# Patient Record
Sex: FEMALE | Race: WHITE | Employment: FULL TIME | URBAN - METROPOLITAN AREA
[De-identification: names, ages, dates, MRNs, and addresses within clinical notes are randomized per-mention and may not be internally consistent; named-entity substitution may affect disease eponyms.]

---

## 2020-08-18 ENCOUNTER — OFFICE VISIT (OUTPATIENT)
Dept: PRIMARY CARE CLINIC | Age: 30
End: 2020-08-18
Payer: COMMERCIAL

## 2020-08-18 VITALS
HEIGHT: 68 IN | OXYGEN SATURATION: 98 % | HEART RATE: 83 BPM | SYSTOLIC BLOOD PRESSURE: 123 MMHG | RESPIRATION RATE: 20 BRPM | DIASTOLIC BLOOD PRESSURE: 86 MMHG | BODY MASS INDEX: 20.04 KG/M2 | TEMPERATURE: 97.2 F | WEIGHT: 132.25 LBS

## 2020-08-18 VITALS
HEART RATE: 62 BPM | BODY MASS INDEX: 22.34 KG/M2 | DIASTOLIC BLOOD PRESSURE: 59 MMHG | SYSTOLIC BLOOD PRESSURE: 120 MMHG | OXYGEN SATURATION: 97 % | TEMPERATURE: 97.6 F | RESPIRATION RATE: 16 BRPM | WEIGHT: 147.38 LBS | HEIGHT: 68 IN

## 2020-08-18 DIAGNOSIS — Z00.00 ADULT GENERAL MEDICAL EXAM: Primary | ICD-10-CM

## 2020-08-18 PROBLEM — E04.1 THYROID NODULE: Status: ACTIVE | Noted: 2018-06-04

## 2020-08-18 PROCEDURE — 99395 PREV VISIT EST AGE 18-39: CPT | Performed by: FAMILY MEDICINE

## 2020-08-18 RX ORDER — DEXTROAMPHETAMINE SACCHARATE, AMPHETAMINE ASPARTATE, DEXTROAMPHETAMINE SULFATE AND AMPHETAMINE SULFATE 2.5; 2.5; 2.5; 2.5 MG/1; MG/1; MG/1; MG/1
1 TABLET ORAL DAILY
COMMUNITY
Start: 2020-08-16 | End: 2020-08-18 | Stop reason: CLARIF

## 2020-08-18 NOTE — PROGRESS NOTES
Bobby Mejia is a 31 y.o. female who presents today for the following:  Chief Complaint   Patient presents with    Annual Wellness Visit        This patient comes in today for follow up of the following medical conditions: Thyroid Nodule They also have some new problems including: Allergies   Allergen Reactions    Sulfa (Sulfonamide Antibiotics) Hives       No current outpatient medications on file. No current facility-administered medications for this visit. Past Medical History:   Diagnosis Date    Thyroid nodule 6/4/2018       Past Surgical History:   Procedure Laterality Date    HX KNEE ARTHROSCOPY Right 2004    Benign tumor caused femur fracture    HX KNEE ARTHROSCOPY Right 2008    HX KNEE ARTHROSCOPY Right 2009       Family History   Problem Relation Age of Onset    Thyroid Disease Mother     Neuropathy Father        Social History     Socioeconomic History    Marital status:      Spouse name: Not on file    Number of children: Not on file    Years of education: Not on file    Highest education level: Not on file   Occupational History    Not on file   Social Needs    Financial resource strain: Not on file    Food insecurity     Worry: Not on file     Inability: Not on file    Transportation needs     Medical: Not on file     Non-medical: Not on file   Tobacco Use    Smoking status: Never Smoker    Smokeless tobacco: Never Used   Substance and Sexual Activity    Alcohol use: Yes     Alcohol/week: 1.0 standard drinks     Types: 1 Glasses of wine per week     Frequency: Monthly or less     Drinks per session: 1 or 2     Binge frequency: Never     Comment: Occasionally    Drug use: Never    Sexual activity: Yes     Partners: Male     Birth control/protection: I.U.D.    Lifestyle    Physical activity     Days per week: Not on file     Minutes per session: Not on file    Stress: Not on file   Relationships    Social connections     Talks on phone: Not on file Gets together: Not on file     Attends [employer] service: Not on file     Active member of club or organization: Not on file     Attends meetings of clubs or organizations: Not on file     Relationship status: Not on file    Intimate partner violence     Fear of current or ex partner: Not on file     Emotionally abused: Not on file     Physically abused: Not on file     Forced sexual activity: Not on file   Other Topics Concern    Not on file   Social History Narrative    Not on file         Review of Systems   Constitutional: Negative. Respiratory: Negative. Cardiovascular: Negative. Gastrointestinal: Negative. Genitourinary: Negative. Musculoskeletal: Negative. Neurological: Negative. Psychiatric/Behavioral: Negative. All other systems reviewed and are negative. Visit Vitals  /86 (BP 1 Location: Right arm, BP Patient Position: Sitting)   Pulse 83   Temp 97.2 °F (36.2 °C) (Oral)   Resp 20   Ht 5' 8\" (1.727 m)   Wt 132 lb 4 oz (60 kg)   SpO2 98%   BMI 20.11 kg/m²     Physical Exam  Vitals signs and nursing note reviewed. Constitutional:       Appearance: Normal appearance. She is obese. Neck:      Musculoskeletal: Normal range of motion and neck supple. Cardiovascular:      Rate and Rhythm: Normal rate and regular rhythm. Pulses: Normal pulses. Heart sounds: Normal heart sounds. Pulmonary:      Effort: Pulmonary effort is normal.      Breath sounds: Normal breath sounds. Abdominal:      General: Abdomen is flat. Bowel sounds are normal.      Palpations: Abdomen is soft. Musculoskeletal: Normal range of motion. Skin:     General: Skin is warm and dry. Neurological:      General: No focal deficit present. Mental Status: She is alert. Psychiatric:         Mood and Affect: Mood normal.         Behavior: Behavior normal.         Thought Content:  Thought content normal.         Judgment: Judgment normal.              1. Adult general medical exam  This patient is in excellent health.  Continue regular exercise and healthy diet.   - BE WELL HEALTH SCREEN; Future

## 2020-08-19 NOTE — PATIENT INSTRUCTIONS
Well Visit, Ages 25 to 48: Care Instructions  Your Care Instructions     Physical exams can help you stay healthy. Your doctor has checked your overall health and may have suggested ways to take good care of yourself. He or she also may have recommended tests. At home, you can help prevent illness with healthy eating, regular exercise, and other steps. Follow-up care is a key part of your treatment and safety. Be sure to make and go to all appointments, and call your doctor if you are having problems. It's also a good idea to know your test results and keep a list of the medicines you take. How can you care for yourself at home? · Reach and stay at a healthy weight. This will lower your risk for many problems, such as obesity, diabetes, heart disease, and high blood pressure. · Get at least 30 minutes of physical activity on most days of the week. Walking is a good choice. You also may want to do other activities, such as running, swimming, cycling, or playing tennis or team sports. Discuss any changes in your exercise program with your doctor. · Do not smoke or allow others to smoke around you. If you need help quitting, talk to your doctor about stop-smoking programs and medicines. These can increase your chances of quitting for good. · Talk to your doctor about whether you have any risk factors for sexually transmitted infections (STIs). Having one sex partner (who does not have STIs and does not have sex with anyone else) is a good way to avoid these infections. · Use birth control if you do not want to have children at this time. Talk with your doctor about the choices available and what might be best for you. · Protect your skin from too much sun. When you're outdoors from 10 a.m. to 4 p.m., stay in the shade or cover up with clothing and a hat with a wide brim. Wear sunglasses that block UV rays. Even when it's cloudy, put broad-spectrum sunscreen (SPF 30 or higher) on any exposed skin.   · See a dentist one or two times a year for checkups and to have your teeth cleaned. · Wear a seat belt in the car. Follow your doctor's advice about when to have certain tests. These tests can spot problems early. For everyone  · Cholesterol. Have the fat (cholesterol) in your blood tested after age 21. Your doctor will tell you how often to have this done based on your age, family history, or other things that can increase your risk for heart disease. · Blood pressure. Have your blood pressure checked during a routine doctor visit. Your doctor will tell you how often to check your blood pressure based on your age, your blood pressure results, and other factors. · Vision. Talk with your doctor about how often to have a glaucoma test.  · Diabetes. Ask your doctor whether you should have tests for diabetes. · Colon cancer. Your risk for colorectal cancer gets higher as you get older. Some experts say that adults should start regular screening at age 48 and stop at age 76. Others say to start before age 48 or continue after age 76. Talk with your doctor about your risk and when to start and stop screening. For women  · Breast exam and mammogram. Talk to your doctor about when you should have a clinical breast exam and a mammogram. Medical experts differ on whether and how often women under 50 should have these tests. Your doctor can help you decide what is right for you. · Cervical cancer screening test and pelvic exam. Begin with a Pap test at age 24. The test often is part of a pelvic exam. Starting at age 27, you may choose to have a Pap test, an HPV test, or both tests at the same time (called co-testing). Talk with your doctor about how often to have testing. · Tests for sexually transmitted infections (STIs). Ask whether you should have tests for STIs. You may be at risk if you have sex with more than one person, especially if your partners do not wear condoms.   For men  · Tests for sexually transmitted infections (STIs). Ask whether you should have tests for STIs. You may be at risk if you have sex with more than one person, especially if you do not wear a condom. · Testicular cancer exam. Ask your doctor whether you should check your testicles regularly. · Prostate exam. Talk to your doctor about whether you should have a blood test (called a PSA test) for prostate cancer. Experts differ on whether and when men should have this test. Some experts suggest it if you are older than 39 and are -American or have a father or brother who got prostate cancer when he was younger than 72. When should you call for help? Watch closely for changes in your health, and be sure to contact your doctor if you have any problems or symptoms that concern you. Where can you learn more? Go to http://nessa-selam.info/  Enter P072 in the search box to learn more about \"Well Visit, Ages 25 to 48: Care Instructions. \"  Current as of: August 22, 2019               Content Version: 12.5  © 0311-5694 [employer]. Care instructions adapted under license by [employer] (which disclaims liability or warranty for this information). If you have questions about a medical condition or this instruction, always ask your healthcare professional. [place] [address] any warranty or liability for your use of this information.

## 2020-09-22 ENCOUNTER — PATIENT MESSAGE (OUTPATIENT)
Dept: PRIMARY CARE CLINIC | Age: 30
End: 2020-09-22

## 2020-12-09 DIAGNOSIS — L29.9 ITCHING: Primary | ICD-10-CM

## 2020-12-09 RX ORDER — PERMETHRIN 50 MG/G
CREAM TOPICAL
Qty: 60 G | Refills: 0 | Status: SHIPPED | OUTPATIENT
Start: 2020-12-09

## 2021-03-25 ENCOUNTER — TELEPHONE (OUTPATIENT)
Dept: OBGYN CLINIC | Age: 31
End: 2021-03-25

## 2021-03-25 NOTE — TELEPHONE ENCOUNTER
I called the patient and left a message that her Mirena IUD was in office. She was told to call back to schedule insertion.

## 2021-06-08 DIAGNOSIS — F41.9 ANXIETY DUE TO INVASIVE PROCEDURE: Primary | ICD-10-CM

## 2021-06-08 RX ORDER — DIAZEPAM 5 MG/1
TABLET ORAL
Qty: 3 TABLET | Refills: 0 | Status: SHIPPED | OUTPATIENT
Start: 2021-06-08

## 2021-07-02 ENCOUNTER — OFFICE VISIT (OUTPATIENT)
Dept: OBGYN CLINIC | Age: 31
End: 2021-07-02
Payer: COMMERCIAL

## 2021-07-02 VITALS
HEART RATE: 71 BPM | WEIGHT: 128 LBS | BODY MASS INDEX: 19.4 KG/M2 | HEIGHT: 68 IN | SYSTOLIC BLOOD PRESSURE: 122 MMHG | DIASTOLIC BLOOD PRESSURE: 87 MMHG

## 2021-07-02 DIAGNOSIS — N94.89 SUPPRESSION OF MENSES: ICD-10-CM

## 2021-07-02 DIAGNOSIS — Z30.433 ENCOUNTER FOR IUD REMOVAL AND REINSERTION: ICD-10-CM

## 2021-07-02 DIAGNOSIS — Z12.4 SCREENING FOR MALIGNANT NEOPLASM OF CERVIX: Primary | ICD-10-CM

## 2021-07-02 PROCEDURE — 58300 INSERT INTRAUTERINE DEVICE: CPT | Performed by: OBSTETRICS & GYNECOLOGY

## 2021-07-02 PROCEDURE — 99395 PREV VISIT EST AGE 18-39: CPT | Performed by: OBSTETRICS & GYNECOLOGY

## 2021-07-02 PROCEDURE — 58301 REMOVE INTRAUTERINE DEVICE: CPT | Performed by: OBSTETRICS & GYNECOLOGY

## 2021-07-02 RX ORDER — DEXTROAMPHETAMINE SACCHARATE, AMPHETAMINE ASPARTATE, DEXTROAMPHETAMINE SULFATE AND AMPHETAMINE SULFATE 2.5; 2.5; 2.5; 2.5 MG/1; MG/1; MG/1; MG/1
TABLET ORAL
COMMUNITY
Start: 2021-05-26

## 2021-07-02 NOTE — PROGRESS NOTES
Subjective:  Chief Complaints:       1. IUD Removal and Reinsertion    HPI:         Woody Torres is a 27 y.o. female who is here today for IUD removal and reinsertion. She was previously counseled concerning risks,benefits, side effects and alternatives. Risks include infection, excess bleeding, perforation of uterus and internal organs, rejection. All questions were answered. She denies an active vaginal infection and pregnancy. ROS:  RESPIRATORY:     Negative for shortness of breath, chest pain, chest congestion, cough. CARDIOLOGY:    Negative for dizziness, chest pain, palpitations. GASTROENTEROLOGY:    Negative for nausea, heartburn, vomiting, abdominal pain, constipation. FEMALE REPRODUCTIVE:    Negative for abnormal vaginal discharge, abnormal vaginal bleeding. UROLOGY:    Negative for difficulty urinating, voiding dysfunction, frequent urination, dysuria. Gyn History:  Patient is currently sexually active. Patients last menstrual period was . Periods are currently regular. OB History:    OB History    Para Term  AB Living   1 1 1     1   SAB TAB Ectopic Molar Multiple Live Births             1      # Outcome Date GA Lbr Edgardo/2nd Weight Sex Delivery Anes PTL Lv   1 Term 16 38w0d  6 lb 1 oz (2.75 kg) F Vag-Spont   VALORIE        Medications:  Current Outpatient Medications   Medication Sig    dextroamphetamine-amphetamine (ADDERALL) 10 mg tablet TAKE 1 TABLET BY MOUTH EVERY DAY IN THE MORNING    diazePAM (VALIUM) 5 mg tablet Take 1 tablet by mouth 1 hour prior to procedure as needed (Patient not taking: Reported on 2021)    permethrin (ACTICIN) 5 % topical cream apply sparingly as directed (Patient not taking: Reported on 2021)     No current facility-administered medications for this visit.        Objective:  Physical Examination:  GENERAL:     General: appearance:  well-appearing   HEART: normal   BREAST: Normal, no masses palpated, no nipple discharge  LUNGS: clear bilaterally  ABDOMEN:  normal, soft. GENITOURINARY - FEMALE:      External genitalia: normal, no lesions. Urethra: normal external meatus. Vagina: unremarkable. Cervix/cuff: normal appearance, no lesions/discharge/bleeding, IUD/IUS strings present/intact IUD removed intact with strings, without difficulty. Uterus:  normal size/shape/consistency. Adnexa: B/L nontender, no masses. Assessment:  The primary encounter diagnosis was Screening for malignant neoplasm of cervix. Diagnoses of Suppression of menses and Encounter for IUD removal and reinsertion were also pertinent to this visit. Plan:  Start Mirena device, 52mg, 1 ea, by intrauteral administration, once, 1 dose. Notes:  Mirena place today without difficulty. Educated that IUD strings need to be checked periodically. Patient is to return for string checks if partner feels uncomfortable of pain due to strings. Educated to continue annual exams and paps as recommended; follow up for recheck. Patient counseled to notify provider with heavy bleeding, abdominal pain, fever or chills, missing strings. Patient understands she may have cramping, spotting and breast tenderness for up to 6 months. Procedures:  IUD Removal:     Consent:  Informed consent obtained, 30 second time out taken. The patient was placed in the lithotomy position, the cervix was prepped with betadine. Procedure:  the strings were easily visualized and grasped with ring forceps, IUD was easily removed with minimal traction. Post Procedure:  patient tolerated the procedure well. IUD Insertion:     Consent informed consent obtained, 30 second time out taken. Prep: The cervix was prepped with betadine. Procedure:  A single tooth tenaculum was placed, the uterus was sounded and found to be 7cm, the Mirena was inserted without difficulty, the strings were cut to 2cm in length. Post Procedure:   The patient tolerated procedure well, instructed to take NSAIDs as directed for cramping, informed her that cramping and spotting are to be expected. Follow-up and Dispositions    · Return in about 6 weeks (around 8/13/2021) for IUD check. Orders Placed This Encounter    [place] REMOVAL W/ REINSERT DRUG IMPLANT DEVICE    dextroamphetamine-amphetamine (ADDERALL) 10 mg tablet     Sig: TAKE 1 TABLET BY MOUTH EVERY DAY IN THE MORNING    levonorgestreL (MIRENA) 20 mcg/24 hours (6 yrs) 52 mg IUD 20 mcg    PAP IG, CT-NG-TV, APTIMA HPV AND RFX 82/98,47([id number]) ([facility])     Order Specific Question:   Pap Source? Answer:   Endocervical     Order Specific Question:   Total Hysterectomy? Answer:   No     Order Specific Question:   Supracervical Hysterectomy? Answer:   No     Order Specific Question:   Post Menopausal?     Answer:   No     Order Specific Question:   Hormone Therapy? Answer:   No     Order Specific Question:   IUD? Answer:   Yes     Order Specific Question:   Abnormal Bleeding? Answer:   No     Order Specific Question:   Pregnant     Answer:   No     Order Specific Question:   Post Partum? Answer:   No       Follow Up: Follow-up and Dispositions    · Return in about 6 weeks (around 8/13/2021) for IUD check.

## 2021-07-07 LAB
C TRACH RRNA CVX QL NAA+PROBE: NEGATIVE
CYTOLOGIST CVX/VAG CYTO: NORMAL
CYTOLOGY CVX/VAG DOC CYTO: NORMAL
CYTOLOGY CVX/VAG DOC THIN PREP: NORMAL
DX ICD CODE: NORMAL
HPV I/H RISK 4 DNA CVX QL PROBE+SIG AMP: NEGATIVE
Lab: NORMAL
Lab: NORMAL
N GONORRHOEA RRNA CVX QL NAA+PROBE: NEGATIVE
OTHER STN SPEC: NORMAL
STAT OF ADQ CVX/VAG CYTO-IMP: NORMAL
T VAGINALIS RRNA SPEC QL NAA+PROBE: NEGATIVE

## 2021-12-29 DIAGNOSIS — N76.0 ACUTE VAGINITIS: ICD-10-CM

## 2021-12-29 DIAGNOSIS — R11.0 NAUSEA: Primary | ICD-10-CM

## 2021-12-29 RX ORDER — ONDANSETRON 8 MG/1
8 TABLET, ORALLY DISINTEGRATING ORAL
Qty: 15 TABLET | Refills: 2 | Status: SHIPPED | OUTPATIENT
Start: 2021-12-29

## 2021-12-29 RX ORDER — FLUCONAZOLE 150 MG/1
150 TABLET ORAL DAILY
Qty: 5 TABLET | Refills: 2 | Status: SHIPPED | OUTPATIENT
Start: 2021-12-29

## 2022-11-18 ENCOUNTER — OFFICE VISIT (OUTPATIENT)
Dept: PRIMARY CARE CLINIC | Age: 32
End: 2022-11-18

## 2022-11-18 DIAGNOSIS — Z23 NEEDS FLU SHOT: Primary | ICD-10-CM

## 2022-11-18 PROCEDURE — 90471 IMMUNIZATION ADMIN: CPT

## 2022-11-18 PROCEDURE — 99211 OFF/OP EST MAY X REQ PHY/QHP: CPT

## 2022-11-18 PROCEDURE — 90686 IIV4 VACC NO PRSV 0.5 ML IM: CPT
